# Patient Record
Sex: MALE | Race: WHITE
[De-identification: names, ages, dates, MRNs, and addresses within clinical notes are randomized per-mention and may not be internally consistent; named-entity substitution may affect disease eponyms.]

---

## 2018-07-18 ENCOUNTER — HOSPITAL ENCOUNTER (OUTPATIENT)
Age: 78
End: 2018-07-18
Payer: MEDICARE

## 2018-07-18 DIAGNOSIS — G89.29: ICD-10-CM

## 2018-07-18 DIAGNOSIS — M21.41: ICD-10-CM

## 2018-07-18 DIAGNOSIS — M25.572: ICD-10-CM

## 2018-07-18 DIAGNOSIS — G57.52: ICD-10-CM

## 2018-07-18 DIAGNOSIS — M19.072: Primary | ICD-10-CM

## 2018-07-18 PROCEDURE — 78300 BONE IMAGING LIMITED AREA: CPT

## 2018-07-18 PROCEDURE — 73700 CT LOWER EXTREMITY W/O DYE: CPT

## 2018-07-18 NOTE — DI.NM.S_ITS
PROCEDURE:  NM BONE SCAN LIMITED AREA  
   
RADIOPHARMACEUTICAL: 19.7 mCi Tc-99m MDP IV.  
   
INDICATIONS:  Chronic left ankle pain status post tarsal tunnel release  
   
TECHNIQUE:    
Multiple delayed bone scintigrams of the body part of interest were obtained   
approximately 3 hours after intravenous injection of Tc-99m MDP.    
   
COMPARISON:  [facility], CT, CT LE LT WO CON, 7/18/2018, 9:13.  
   
FINDINGS: Prominent isotope uptake is seen in the area of CT documented midfoot   
degenerative change at the tarsal-metatarsal articulations of the second, third, fourth   
and to a small degree fifth rays.  
   
IMPRESSION: Prominent degenerative change as discussed, please also refer to the report   
from CT scanning discussing the tarsal-metatarsal articulations and degenerative changes   
in those areas currently.  Fracture was not identified.  
   
   
   
Dictated by:  Bobby Valdivia M.D. on 7/18/2018 at 16:52       
Approved by:  Bobby Valdivia M.D. on 7/18/2018 at 16:54

## 2018-07-18 NOTE — DI.CT.S_ITS
PROCEDURE:  CT LE LT W CON  
   
INDICATIONS:  Chronic Left Foot and Ankle  
   
TECHNIQUE:    
Noncontrast 1-1.5 mm axial sections acquired from above the tibiotalar joint to the   
bottom of the calcaneus, with coronal and sagittal reformats.    
   
COMPARISON:  None.  
   
FINDINGS:    
Image quality:  Excellent.    
   
Bones: No acute fracture or focal osseous destruction is seen. There is diffuse midfoot   
joint degeneration with subchondral cystic change and spurring especially along the   
tarsometatarsal joints diffusely. Alignment appears anatomic. There is moderate first MTP   
joint degeneration. Marginal lucencies possible erosion seen along the medial aspect of   
the first metatarsal head. Further evaluation (and continued long-term surveillance) with   
serial radiographs could be performed as clinically warranted.  
   
Soft tissues: There are numerous vascular calcifications noted. There is diffuse hindfoot   
and plantar subcutaneous soft tissue swelling and stranding, nonspecific and recommend   
clinical correlation.  
   
IMPRESSION:  
   
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 This distribution of disease in   
conjunction with the extensive vascular calcifications raise the possibility of early   
neuropathic arthropathy. Please correlate clinically  
   
Erosive appearance involving the medial aspect of the first metatarsal head. Please   
correlate clinically and with laboratory data. Gout is in the differential.  
   
   
   
   
   
Dictated by: Zak Torres M.D. on 7/18/2018 at 9:44       
Approved by: Zak Torres M.D. on 7/18/2018 at 9:51

## 2019-05-04 ENCOUNTER — HOSPITAL ENCOUNTER (OUTPATIENT)
Dept: HOSPITAL 76 - DI | Age: 79
Discharge: HOME | End: 2019-05-04
Attending: FAMILY MEDICINE
Payer: MEDICARE

## 2019-05-04 DIAGNOSIS — R05: Primary | ICD-10-CM

## 2019-05-04 PROCEDURE — 71046 X-RAY EXAM CHEST 2 VIEWS: CPT
